# Patient Record
Sex: FEMALE | URBAN - METROPOLITAN AREA
[De-identification: names, ages, dates, MRNs, and addresses within clinical notes are randomized per-mention and may not be internally consistent; named-entity substitution may affect disease eponyms.]

---

## 2020-08-06 ENCOUNTER — APPOINTMENT (RX ONLY)
Dept: URBAN - METROPOLITAN AREA CLINIC 110 | Facility: CLINIC | Age: 50
Setting detail: DERMATOLOGY
End: 2020-08-06

## 2020-08-06 DIAGNOSIS — T300 ERYTHEMA [FIRST DEGREE], UNSPECIFIED SITE: ICD-10-CM

## 2020-08-06 PROBLEM — T20.16XA BURN OF FIRST DEGREE OF FOREHEAD AND CHEEK, INITIAL ENCOUNTER: Status: ACTIVE | Noted: 2020-08-06

## 2020-08-06 PROBLEM — T22.111A BURN OF FIRST DEGREE OF RIGHT FOREARM, INITIAL ENCOUNTER: Status: ACTIVE | Noted: 2020-08-06

## 2020-08-06 PROCEDURE — ? COUNSELING

## 2020-08-06 PROCEDURE — 99202 OFFICE O/P NEW SF 15 MIN: CPT | Mod: 95

## 2020-08-06 PROCEDURE — ? ADDITIONAL NOTES

## 2020-08-06 ASSESSMENT — LOCATION ZONE DERM
LOCATION ZONE: ARM
LOCATION ZONE: FACE

## 2020-08-06 ASSESSMENT — LOCATION DETAILED DESCRIPTION DERM
LOCATION DETAILED: RIGHT INFERIOR LATERAL MALAR CHEEK
LOCATION DETAILED: RIGHT CENTRAL BUCCAL CHEEK
LOCATION DETAILED: RIGHT VENTRAL DISTAL FOREARM

## 2020-08-06 ASSESSMENT — LOCATION SIMPLE DESCRIPTION DERM
LOCATION SIMPLE: RIGHT FOREARM
LOCATION SIMPLE: RIGHT CHEEK

## 2020-08-06 NOTE — PROCEDURE: MIPS QUALITY
Detail Level: Detailed
Quality 130: Documentation Of Current Medications In The Medical Record: Current Medications Documented
Quality 402: Tobacco Use And Help With Quitting Among Adolescents: Patient screened for tobacco and never smoked
Quality 134: Screening For Clinical Depression And Follow-Up Plan: The patient was screened for depression and the screen was negative and no follow up required

## 2020-08-06 NOTE — PROCEDURE: ADDITIONAL NOTES
Additional Notes: After discussion of the risks benefits and limitations with respect to this telehealth visit, including\\npotential limitations in picture and video quality, the patient has given verbal consent to proceed with\\nthis telehealth visit. Interactive audio and video telecommunications were used to permit real-time\\ncommunication between myself and the patient. This telehealth visit was performed due to the\\nnational COVID-19 emergency and recommended social distancing.
Detail Level: Simple
Additional Notes: Patient reports that on July 31 she was making coffee and it splashed on her face and arm. All the small burned spots on her face have mostly healed. She has been applying Vaseline at the recommendation of the ER physician. I advised her to continue to apply Vaseline for couple more days and then switch to Mederma scar gel to avoid any scarring. She also has an erythematous lesion approximately 4-5 cm in diameter that was caused by the coffee burn. I advised her to continue Vaseline on the arm until erythema improves and then switch to Mederma as well.